# Patient Record
Sex: MALE | Race: WHITE | NOT HISPANIC OR LATINO
[De-identification: names, ages, dates, MRNs, and addresses within clinical notes are randomized per-mention and may not be internally consistent; named-entity substitution may affect disease eponyms.]

---

## 2024-06-17 ENCOUNTER — DASHBOARD ENCOUNTERS (OUTPATIENT)
Age: 44
End: 2024-06-17

## 2024-06-17 ENCOUNTER — OFFICE VISIT (OUTPATIENT)
Dept: URBAN - METROPOLITAN AREA CLINIC 54 | Facility: CLINIC | Age: 44
End: 2024-06-17
Payer: COMMERCIAL

## 2024-06-17 ENCOUNTER — LAB OUTSIDE AN ENCOUNTER (OUTPATIENT)
Dept: URBAN - METROPOLITAN AREA CLINIC 54 | Facility: CLINIC | Age: 44
End: 2024-06-17

## 2024-06-17 VITALS
SYSTOLIC BLOOD PRESSURE: 128 MMHG | TEMPERATURE: 97 F | WEIGHT: 193 LBS | HEIGHT: 71 IN | BODY MASS INDEX: 27.02 KG/M2 | HEART RATE: 69 BPM | DIASTOLIC BLOOD PRESSURE: 88 MMHG

## 2024-06-17 DIAGNOSIS — F41.9 ANXIETY: ICD-10-CM

## 2024-06-17 DIAGNOSIS — R09.A2 GLOBUS SENSATION: ICD-10-CM

## 2024-06-17 DIAGNOSIS — R13.19 ESOPHAGEAL DYSPHAGIA: ICD-10-CM

## 2024-06-17 PROCEDURE — 99204 OFFICE O/P NEW MOD 45 MIN: CPT

## 2024-06-17 RX ORDER — DULOXETINE HYDROCHLORIDE 20 MG/1
1 CAPSULE CAPSULE, DELAYED RELEASE ORAL ONCE A DAY
Status: ACTIVE | COMMUNITY

## 2024-06-17 RX ORDER — HYOSCYAMINE SULFATE 0.12 MG/1
2 TABLETS AS NEEDED BEFORE MEALS TABLET SUBLINGUAL THREE TIMES A DAY
Qty: 180 TABLET | Refills: 0 | OUTPATIENT
Start: 2024-06-17 | End: 2024-07-17

## 2024-06-17 RX ORDER — LEVOTHYROXINE SODIUM 175 UG/1
1 TABLET IN THE MORNING ON AN EMPTY STOMACH TABLET ORAL ONCE A DAY
Status: ACTIVE | COMMUNITY

## 2024-06-17 RX ORDER — CLONAZEPAM 0.5 MG/1
1 TABLET TABLET ORAL ONCE A DAY
Status: ACTIVE | COMMUNITY

## 2024-06-17 NOTE — HPI-TODAY'S VISIT:
6/17/24: Pt is a healthy 42 yo male with a hx of hiatal hernia s/p Nissen fundoplication x2 who presents for dysphagia. Prior to this year pt states he was always very healthy, working as a full-time . Multiple ER visits this year for anxiety related symptoms like chest pain/tightness, SOB, now globus. This anxiety is new to him despite having no life stressors at this time. Workup significant for slightly abnormal TSH but endocrinology says this is controlled. Cardiac workup has been normal including stress echo. Pt describes feeling like something is always stuck in his throat, at level of clavicle. Worse when eating. Klonopin helps some. Denies choking or feeling food get hung up, but has completely altered eating habits to avoid this. Only eating soft foods, avoids and meat/bread, eats very slowly, etc. No odynophagia, no heartburn since Nissen. May have some acid reflux. Very worried about cancer. No family history of esophageal cancer. Nonsmoker, does chew tobacco. Last EGD was 2012.

## 2024-06-18 ENCOUNTER — OFFICE VISIT (OUTPATIENT)
Dept: URBAN - METROPOLITAN AREA SURGERY CENTER 14 | Facility: SURGERY CENTER | Age: 44
End: 2024-06-18
Payer: COMMERCIAL

## 2024-06-18 DIAGNOSIS — R13.19 OTHER DYSPHAGIA: ICD-10-CM

## 2024-06-18 DIAGNOSIS — K26.9 DUODENAL ULCER: ICD-10-CM

## 2024-06-18 DIAGNOSIS — K26.3 ACUTE DUODENAL ULCER WITHOUT HEMORRHAGE OR PERFORATION: ICD-10-CM

## 2024-06-18 DIAGNOSIS — K22.89 OTHER SPECIFIED DISEASE OF ESOPHAGUS: ICD-10-CM

## 2024-06-18 DIAGNOSIS — K29.50 MILD CHRONIC GASTRITIS: ICD-10-CM

## 2024-06-18 PROCEDURE — 43239 EGD BIOPSY SINGLE/MULTIPLE: CPT | Performed by: INTERNAL MEDICINE

## 2024-06-18 PROCEDURE — 00731 ANES UPR GI NDSC PX NOS: CPT | Performed by: NURSE ANESTHETIST, CERTIFIED REGISTERED

## 2024-06-18 PROCEDURE — 43248 EGD GUIDE WIRE INSERTION: CPT | Performed by: INTERNAL MEDICINE

## 2024-06-18 RX ORDER — DULOXETINE HYDROCHLORIDE 20 MG/1
1 CAPSULE CAPSULE, DELAYED RELEASE ORAL ONCE A DAY
Status: ACTIVE | COMMUNITY

## 2024-06-18 RX ORDER — CLONAZEPAM 0.5 MG/1
1 TABLET TABLET ORAL ONCE A DAY
Status: ACTIVE | COMMUNITY

## 2024-06-18 RX ORDER — HYOSCYAMINE SULFATE 0.12 MG/1
2 TABLETS AS NEEDED BEFORE MEALS TABLET SUBLINGUAL THREE TIMES A DAY
Qty: 180 TABLET | Refills: 0 | Status: ACTIVE | COMMUNITY
Start: 2024-06-17 | End: 2024-07-17

## 2024-06-18 RX ORDER — LEVOTHYROXINE SODIUM 175 UG/1
1 TABLET IN THE MORNING ON AN EMPTY STOMACH TABLET ORAL ONCE A DAY
Status: ACTIVE | COMMUNITY

## 2024-06-30 ENCOUNTER — ERX REFILL RESPONSE (OUTPATIENT)
Dept: URBAN - METROPOLITAN AREA CLINIC 54 | Facility: CLINIC | Age: 44
End: 2024-06-30

## 2024-06-30 RX ORDER — HYOSCYAMINE SULFATE 0.12 MG/1
2 TABLETS AS NEEDED BEFORE MEALS TABLET SUBLINGUAL THREE TIMES A DAY
Qty: 180 TABLET | Refills: 0 | OUTPATIENT

## 2024-06-30 RX ORDER — HYOSCYAMINE SULFATE 0.12 MG/1
2 TABLETS AS NEEDED BEFORE MEALS TABLET SUBLINGUAL THREE TIMES A DAY
Qty: 540 TABLET | Refills: 0 | OUTPATIENT